# Patient Record
(demographics unavailable — no encounter records)

---

## 2025-06-10 NOTE — END OF VISIT
[FreeTextEntry3] :  I, Dr. Kamara, personally performed the evaluation and management (E/M) services for this new patient. That E/M includes conducting the clinically appropriate initial history &/or exam, assessing all conditions, and establishing the plan of care. Today, my WAYNE, was here to observe my evaluation and management service for this patient & follow plan of care established by me going forward.  [Time Spent: ___ minutes] : I have spent [unfilled] minutes of time on the encounter which excludes teaching and separately reported services.

## 2025-06-10 NOTE — PHYSICAL EXAM
[Alert] : alert [Normal Voice/Communication] : normal voice/communication [No Acute Distress] : no acute distress [Obese (BMI >= 30)] : obese (BMI >= 30) [Well Developed] : well developed [Sclera] : the sclera and conjunctiva were normal [Hearing Threshold Finger Rub Not [place]] : hearing was normal [Normal Appearance] : the appearance of the neck was normal [No Respiratory Distress] : no respiratory distress [No Acc Muscle Use] : no accessory muscle use [Respiration, Rhythm And Depth] : normal respiratory rhythm and effort [Abdomen Tenderness] : non-tender [Abdomen Soft] : soft [Abnormal Walk] : normal gait [Normal Color / Pigmentation] : normal skin color and pigmentation [Oriented To Time, Place, And Person] : oriented to person, place, and time

## 2025-06-10 NOTE — PHYSICAL EXAM
[Alert] : alert [Normal Voice/Communication] : normal voice/communication [No Acute Distress] : no acute distress [Obese (BMI >= 30)] : obese (BMI >= 30) [Well Developed] : well developed [Sclera] : the sclera and conjunctiva were normal [Hearing Threshold Finger Rub Not [place]] : hearing was normal [Normal Appearance] : the appearance of the neck was normal [No Respiratory Distress] : no respiratory distress [No Acc Muscle Use] : no accessory muscle use [Abdomen Tenderness] : non-tender [Respiration, Rhythm And Depth] : normal respiratory rhythm and effort [Abnormal Walk] : normal gait [Abdomen Soft] : soft [Normal Color / Pigmentation] : normal skin color and pigmentation [Oriented To Time, Place, And Person] : oriented to person, place, and time

## 2025-06-10 NOTE — HISTORY OF PRESENT ILLNESS
[FreeTextEntry1] : 67 yr old female at average risk for CRC, CAD (stenting done about a year ago, on plavix, followed by Dr. Huber), HTN, HLD presents here for further evaluation of epigastric pain and heartburn and CRC screening.   She C/O epigastric pain, often at night associated with heartburn for the past few months. She sometimes gets heartburn during the day relieved by tums. No vomiting, dysphagia, weight loss, or blood in the stool. She has a H/O constipation; she takes a stool softener but not q day.   No prior colonoscopies.

## 2025-06-10 NOTE — ASSESSMENT
[FreeTextEntry1] : 67 yr old female at average risk for CRC, CAD (stenting done about a year ago, on plavix, followed by Dr. Huber), HTN, HLD presents here for further evaluation of epigastric pain and heartburn and CRC screening.     Epigastric pain New Heartburn after gaining weight - Start pantoprazole 40 mg OD - Will schedule an EGD; risks vs benefits discussed  CRC screening - Will schedule a colonoscopy; risks vs benefits discussed   Constipation - She was told to take colace q day, especially prior to colonoscopy  I have spent 50 minutes of time on the encounter which excludes teaching time and/or separately reported services.